# Patient Record
Sex: FEMALE | Race: BLACK OR AFRICAN AMERICAN | NOT HISPANIC OR LATINO | Employment: UNEMPLOYED | URBAN - METROPOLITAN AREA
[De-identification: names, ages, dates, MRNs, and addresses within clinical notes are randomized per-mention and may not be internally consistent; named-entity substitution may affect disease eponyms.]

---

## 2020-10-20 ENCOUNTER — HISTORICAL (OUTPATIENT)
Dept: ANESTHESIOLOGY | Facility: HOSPITAL | Age: 40
End: 2020-10-20

## 2020-10-20 LAB — B-HCG SERPL QL: NEGATIVE

## 2022-04-11 ENCOUNTER — HISTORICAL (OUTPATIENT)
Dept: ADMINISTRATIVE | Facility: HOSPITAL | Age: 42
End: 2022-04-11

## 2022-04-26 VITALS
HEIGHT: 65 IN | SYSTOLIC BLOOD PRESSURE: 133 MMHG | WEIGHT: 133.25 LBS | DIASTOLIC BLOOD PRESSURE: 82 MMHG | BODY MASS INDEX: 22.2 KG/M2

## 2022-05-04 NOTE — HISTORICAL [facility] CERNER
This is a historical note converted from [employer]. Formatting and pictures may have been removed.  Please reference [employer] for original formatting and attached multimedia. Type of Procedure: Fluoroscopically Guided Cervical Interlaminar Epidural Steroid Injection  ?  Physician: Bipin Murray III, MD  ?  Diagnosis: Cervical radiculopathy  ?  Description of Procedure:  After appropriate informed consent discussing the risks, benefits and possible complications, the patient was taken to the procedure suite. NIBP, pulse rate, and oxygen saturation were monitored throughout the procedure.?  ?  The patient was positioned prone and prepped and draped in a sterile fashion. The C6-7 interspace was identified fluoroscopically. The skin was anesthetized via 25-gauge 1.5?? needle with approximately 5 cc of 1% lidocaine. A 20-gauge Tuohy needle was atraumatically introduced and advanced under fluoroscopic guidance. Using loss of resistance technique with 0.9 % preservative free normal saline the epidural space was entered without difficulties. Following negative aspiration for blood and CSF and confirming the absence of paresthesias, injection of approximately 1.5 cc of Omnipaque 300 demonstrated excellent epidural spread without vascular or intrathecal uptake. At this point, 80 mg of Depo-Medrol followed by 2 mL of normal saline was injected without complication.  ?  The needle was re-styletted and removed. Adhesive dressing was applied over the site. Patient tolerated the procedure well without any apparent complications. The patient was transferred back to the recovery area and then discharged home.

## 2023-09-08 ENCOUNTER — OFFICE VISIT (OUTPATIENT)
Dept: OBSTETRICS AND GYNECOLOGY | Facility: CLINIC | Age: 43
End: 2023-09-08
Payer: MEDICARE

## 2023-09-08 VITALS
RESPIRATION RATE: 18 BRPM | HEIGHT: 64 IN | HEART RATE: 84 BPM | WEIGHT: 134.5 LBS | OXYGEN SATURATION: 99 % | DIASTOLIC BLOOD PRESSURE: 64 MMHG | SYSTOLIC BLOOD PRESSURE: 128 MMHG | BODY MASS INDEX: 22.96 KG/M2

## 2023-09-08 DIAGNOSIS — R53.83 FATIGUE, UNSPECIFIED TYPE: ICD-10-CM

## 2023-09-08 DIAGNOSIS — Z01.419 ROUTINE GYNECOLOGICAL EXAMINATION: Primary | ICD-10-CM

## 2023-09-08 DIAGNOSIS — R53.83 OTHER FATIGUE: ICD-10-CM

## 2023-09-08 DIAGNOSIS — Z12.31 ENCOUNTER FOR SCREENING MAMMOGRAM FOR MALIGNANT NEOPLASM OF BREAST: ICD-10-CM

## 2023-09-08 DIAGNOSIS — N89.8 VAGINAL ITCHING: ICD-10-CM

## 2023-09-08 DIAGNOSIS — Z11.3 ENCOUNTER FOR SCREENING FOR INFECTIONS WITH PREDOMINANTLY SEXUAL MODE OF TRANSMISSION: ICD-10-CM

## 2023-09-08 DIAGNOSIS — R23.2 HOT FLASHES: ICD-10-CM

## 2023-09-08 LAB
T4 FREE SERPL-MCNC: 1.15 NG/DL (ref 0.78–2.19)
TSH SERPL-ACNC: 1.28 UIU/ML (ref 0.36–3.74)

## 2023-09-08 PROCEDURE — 99204 OFFICE O/P NEW MOD 45 MIN: CPT | Mod: 25,,, | Performed by: OBSTETRICS & GYNECOLOGY

## 2023-09-08 PROCEDURE — 87491 CHLMYD TRACH DNA AMP PROBE: CPT

## 2023-09-08 PROCEDURE — 3074F SYST BP LT 130 MM HG: CPT | Mod: ,,, | Performed by: OBSTETRICS & GYNECOLOGY

## 2023-09-08 PROCEDURE — 3078F PR MOST RECENT DIASTOLIC BLOOD PRESSURE < 80 MM HG: ICD-10-PCS | Mod: ,,, | Performed by: OBSTETRICS & GYNECOLOGY

## 2023-09-08 PROCEDURE — 84439 ASSAY OF FREE THYROXINE: CPT | Performed by: OBSTETRICS & GYNECOLOGY

## 2023-09-08 PROCEDURE — 1159F MED LIST DOCD IN RCRD: CPT | Mod: ,,, | Performed by: OBSTETRICS & GYNECOLOGY

## 2023-09-08 PROCEDURE — 1159F PR MEDICATION LIST DOCUMENTED IN MEDICAL RECORD: ICD-10-PCS | Mod: ,,, | Performed by: OBSTETRICS & GYNECOLOGY

## 2023-09-08 PROCEDURE — 3074F PR MOST RECENT SYSTOLIC BLOOD PRESSURE < 130 MM HG: ICD-10-PCS | Mod: ,,, | Performed by: OBSTETRICS & GYNECOLOGY

## 2023-09-08 PROCEDURE — 88174 CYTOPATH C/V AUTO IN FLUID: CPT | Performed by: OBSTETRICS & GYNECOLOGY

## 2023-09-08 PROCEDURE — 84443 ASSAY THYROID STIM HORMONE: CPT | Performed by: OBSTETRICS & GYNECOLOGY

## 2023-09-08 PROCEDURE — 99386 PR PREVENTIVE VISIT,NEW,40-64: ICD-10-PCS | Mod: ,,, | Performed by: OBSTETRICS & GYNECOLOGY

## 2023-09-08 PROCEDURE — 82670 ASSAY OF TOTAL ESTRADIOL: CPT | Performed by: OBSTETRICS & GYNECOLOGY

## 2023-09-08 PROCEDURE — 87661 TRICHOMONAS VAGINALIS AMPLIF: CPT

## 2023-09-08 PROCEDURE — 3008F PR BODY MASS INDEX (BMI) DOCUMENTED: ICD-10-PCS | Mod: ,,, | Performed by: OBSTETRICS & GYNECOLOGY

## 2023-09-08 PROCEDURE — 83001 ASSAY OF GONADOTROPIN (FSH): CPT | Performed by: OBSTETRICS & GYNECOLOGY

## 2023-09-08 PROCEDURE — 87591 N.GONORRHOEAE DNA AMP PROB: CPT

## 2023-09-08 PROCEDURE — 3078F DIAST BP <80 MM HG: CPT | Mod: ,,, | Performed by: OBSTETRICS & GYNECOLOGY

## 2023-09-08 PROCEDURE — 99386 PREV VISIT NEW AGE 40-64: CPT | Mod: ,,, | Performed by: OBSTETRICS & GYNECOLOGY

## 2023-09-08 PROCEDURE — 3008F BODY MASS INDEX DOCD: CPT | Mod: ,,, | Performed by: OBSTETRICS & GYNECOLOGY

## 2023-09-08 PROCEDURE — 99204 PR OFFICE/OUTPT VISIT, NEW, LEVL IV, 45-59 MIN: ICD-10-PCS | Mod: 25,,, | Performed by: OBSTETRICS & GYNECOLOGY

## 2023-09-08 RX ORDER — FAMOTIDINE 20 MG/1
20 TABLET, FILM COATED ORAL EVERY 12 HOURS
COMMUNITY
Start: 2023-04-20

## 2023-09-08 RX ORDER — PANTOPRAZOLE SODIUM 40 MG/1
40 TABLET, DELAYED RELEASE ORAL EVERY MORNING
COMMUNITY
Start: 2023-07-11

## 2023-09-08 RX ORDER — RIZATRIPTAN BENZOATE 10 MG/1
TABLET ORAL
COMMUNITY
Start: 2023-07-08

## 2023-09-08 RX ORDER — ONDANSETRON 4 MG/1
4 TABLET, ORALLY DISINTEGRATING ORAL EVERY 6 HOURS PRN
COMMUNITY
Start: 2023-04-20 | End: 2023-09-26

## 2023-09-08 RX ORDER — AMLODIPINE BESYLATE 10 MG/1
10 TABLET ORAL
COMMUNITY
Start: 2023-07-08

## 2023-09-08 RX ORDER — BIMATOPROST 0.1 MG/ML
1 SOLUTION/ DROPS OPHTHALMIC NIGHTLY
COMMUNITY
Start: 2023-08-17

## 2023-09-08 RX ORDER — TRAVOPROST OPHTHALMIC SOLUTION 0.04 MG/ML
1 SOLUTION OPHTHALMIC NIGHTLY
COMMUNITY
Start: 2023-08-17

## 2023-09-08 RX ORDER — CLONAZEPAM 0.5 MG/1
0.5 TABLET ORAL
COMMUNITY
Start: 2023-08-28

## 2023-09-08 NOTE — PROGRESS NOTES
Patient ID: [id number]   Chief Complaint: Annual exam  Chief Complaint   Patient presents with    Well Woman     ALSO C/o vaginal itching. C/o hot flashes at night and fatigue.      HPI:   Jacey Blood is a 43 y.o. year old  here for her Annual Exam. No LMP recorded. Patient has had an ablation.   C/o vaginal itching. Denies any odor or discharge.  Denies fever, C/o hot flashes at night for several months and fatigue during the day.    Subjective:     Past Medical History:   Diagnosis Date    Anxiety disorder, unspecified     Hypertension      Past Surgical History:   Procedure Laterality Date    ABLATION      BILATERAL TUBAL LIGATION      cyst removed from breast Left     OVARIAN CYST REMOVAL Bilateral      Social History     Tobacco Use    Smoking status: Every Day     Current packs/day: 0.50     Average packs/day: 0.5 packs/day for 23.7 years (11.9 ttl pk-yrs)     Types: Cigarettes     Start date:      Passive exposure: Current    Smokeless tobacco: Never   Substance Use Topics    Alcohol use: Yes     Comment: social    Drug use: Yes     Frequency: 1.0 times per week     Types: Marijuana     Family History   Problem Relation Age of Onset    Hypertension Father     Hypertension Mother      OB History    Para Term  AB Living   4 3 3   1 3   SAB IAB Ectopic Multiple Live Births       1   3      # Outcome Date GA Lbr Shukri/2nd Weight Sex Delivery Anes PTL Lv   4 Term 09 40w0d   F Vag-Spont EPI  MARYSE   3 Ectopic  3w0d    ECTOPIC   FD   2 Term 10/30/01 40w0d   M Vag-Spont EPI  MARYSE   1 Term 99 40w0d   M Vag-Spont None  MARYSE       Current Outpatient Medications:     amLODIPine (NORVASC) 10 MG tablet, Take 10 mg by mouth., Disp: , Rfl:     clonazePAM (KLONOPIN) 0.5 MG tablet, Take 0.5 mg by mouth., Disp: , Rfl:     famotidine (PEPCID) 20 MG tablet, Take 20 mg by mouth every 12 (twelve) hours., Disp: , Rfl:     LUMIGAN 0.01 % Drop, Place 1 drop into both eyes every evening., Disp: ,  "Rfl:     ondansetron (ZOFRAN-ODT) 4 MG TbDL, Take 4 mg by mouth every 6 (six) hours as needed., Disp: , Rfl:     pantoprazole (PROTONIX) 40 MG tablet, Take 40 mg by mouth every morning., Disp: , Rfl:     rizatriptan (MAXALT) 10 MG tablet, Take by mouth., Disp: , Rfl:     travoprost (TRAVATAN Z) 0.004 % ophthalmic solution, Place 1 drop into both eyes every evening., Disp: , Rfl:   MENARCHEAL:  No cycle -ablation  PAP:  Last PAP: 1/27/2020  History of Abnormal PAP Smear: NO  Treated: n/a  HPV Vaccine: NO  INTERCOURSE:  Dyspareunia: No  Postcoital Bleeding: No  History of STI: Yes   If yes, then: TV  Current Birth Control Method: none  Sexually Active: not currently  BREAST HISTORY:   Last Mammogram: 2021  History of Abnormal Mammogram: NO  MENOPAUSE:  Post Menopausal Bleeding: No  Hormone Replacement Therapy: No  COLONOSCOPY:  Last Colonoscopy:   n/a        Review of Systems 12 point review of systems conducted, negative except as stated in the history of present illness. See HPI for details.  Objective:   Visit Vitals  /64 (BP Location: Left arm)   Pulse 84   Resp 18   Ht 5' 4" (1.626 m)   Wt 61 kg (134 lb 8 oz)   SpO2 99%   BMI 23.09 kg/m²     No results found for this or any previous visit (from the past 24 hour(s)).  Physical Exam:  Physical Exam  Constitutional:  General Appearance : alert, in no acute distress, normal, well nourished.  Respiratory:  Respiratory Effort: normal.  Breast:  Right: Inspection/palpation: no discharge, no masses present, no nipple retraction, no skin changes, no skin dimpling, no tenderness, no lymphadenopathy, no axillary mass, no axillary tenderness.  Left: Inspection/palpation: no discharge, no masses present, no nipple retraction, no skin changes, no skin dimpling, no tenderness, no lymphadenopathy, no axillary mass, no axillary tenderness.  Gastrointestinal:  Abdomen: no masses. no tender, nondistended.  Liver and spleen: normal  Hernias: no hernias present, no inguinal " adenopathy.  Genitourinary:  External Genitalia: normal, no lesions.  Vagina: normal appearance, no abnormal discharge, no lesions.  Bladder: no mass, nontender.  Urethra: no erythema or lesions present.  Cervix: no lesions, non tender. Pap Done/CXS  Uterus: nontender, normal contour, normal mobility, normal size.   Adnexa: no masses, no tenderness.  Anus and Perineum: visually normal.   Chaperone Present  No results found for this or any previous visit (from the past 24 hour(s)).  Assessment/Plan:   Assessment:   Routine gynecological examination  -     Liquid-Based Pap Smear, Screening Screening; Future; Expected date: 09/21/2023  -     Mammo Digital Screening Bilat; Future; Expected date: 09/08/2023    Encounter for screening for infections with predominantly sexual mode of transmission  -     Liquid-Based Pap Smear, Screening Screening; Future; Expected date: 09/21/2023    Encounter for screening mammogram for malignant neoplasm of breast  -     Mammo Digital Screening Bilat; Future; Expected date: 09/08/2023    Vaginal itching  -     MDL Sendout Test    Hot flashes  -     TSH; Future; Expected date: 09/08/2023  -     T4, Free; Future; Expected date: 09/08/2023  -     Follicle Stimulating Hormone; Future; Expected date: 09/08/2023  -     Estradiol; Future; Expected date: 09/08/2023    Fatigue, unspecified type  -     TSH; Future; Expected date: 09/08/2023    Fatigue  -     T4, Free; Future; Expected date: 09/08/2023      Follow up in about 2 weeks (around 9/22/2023) for RESULTS. In addition to their scheduled FU, the patient has also been instructed to follow up on as needed basis. All questions were answered and the patient voiced understanding of the above issues.

## 2023-09-09 LAB
ESTRADIOL SERPL HS-MCNC: <24 PG/ML
FSH SERPL-ACNC: 107.49 MIU/ML

## 2023-09-12 LAB — PSYCHE PATHOLOGY RESULT: NORMAL

## 2023-09-26 ENCOUNTER — OFFICE VISIT (OUTPATIENT)
Dept: OBSTETRICS AND GYNECOLOGY | Facility: CLINIC | Age: 43
End: 2023-09-26
Payer: MEDICARE

## 2023-09-26 VITALS
DIASTOLIC BLOOD PRESSURE: 68 MMHG | WEIGHT: 130.06 LBS | BODY MASS INDEX: 22.2 KG/M2 | HEIGHT: 64 IN | HEART RATE: 86 BPM | SYSTOLIC BLOOD PRESSURE: 120 MMHG

## 2023-09-26 DIAGNOSIS — R53.83 FATIGUE, UNSPECIFIED TYPE: ICD-10-CM

## 2023-09-26 DIAGNOSIS — R23.2 HOT FLASHES: Primary | ICD-10-CM

## 2023-09-26 DIAGNOSIS — Z78.0 POST-MENOPAUSAL: ICD-10-CM

## 2023-09-26 PROCEDURE — 1160F RVW MEDS BY RX/DR IN RCRD: CPT | Mod: ,,, | Performed by: NURSE PRACTITIONER

## 2023-09-26 PROCEDURE — 99213 PR OFFICE/OUTPT VISIT, EST, LEVL III, 20-29 MIN: ICD-10-PCS | Mod: ,,, | Performed by: NURSE PRACTITIONER

## 2023-09-26 PROCEDURE — 3074F PR MOST RECENT SYSTOLIC BLOOD PRESSURE < 130 MM HG: ICD-10-PCS | Mod: ,,, | Performed by: NURSE PRACTITIONER

## 2023-09-26 PROCEDURE — 3074F SYST BP LT 130 MM HG: CPT | Mod: ,,, | Performed by: NURSE PRACTITIONER

## 2023-09-26 PROCEDURE — 99213 OFFICE O/P EST LOW 20 MIN: CPT | Mod: ,,, | Performed by: NURSE PRACTITIONER

## 2023-09-26 PROCEDURE — 3008F BODY MASS INDEX DOCD: CPT | Mod: ,,, | Performed by: NURSE PRACTITIONER

## 2023-09-26 PROCEDURE — 3078F PR MOST RECENT DIASTOLIC BLOOD PRESSURE < 80 MM HG: ICD-10-PCS | Mod: ,,, | Performed by: NURSE PRACTITIONER

## 2023-09-26 PROCEDURE — 1159F PR MEDICATION LIST DOCUMENTED IN MEDICAL RECORD: ICD-10-PCS | Mod: ,,, | Performed by: NURSE PRACTITIONER

## 2023-09-26 PROCEDURE — 1160F PR REVIEW ALL MEDS BY PRESCRIBER/CLIN PHARMACIST DOCUMENTED: ICD-10-PCS | Mod: ,,, | Performed by: NURSE PRACTITIONER

## 2023-09-26 PROCEDURE — 3078F DIAST BP <80 MM HG: CPT | Mod: ,,, | Performed by: NURSE PRACTITIONER

## 2023-09-26 PROCEDURE — 3008F PR BODY MASS INDEX (BMI) DOCUMENTED: ICD-10-PCS | Mod: ,,, | Performed by: NURSE PRACTITIONER

## 2023-09-26 PROCEDURE — 1159F MED LIST DOCD IN RCRD: CPT | Mod: ,,, | Performed by: NURSE PRACTITIONER

## 2023-09-26 RX ORDER — ATORVASTATIN CALCIUM 40 MG/1
TABLET, FILM COATED ORAL
COMMUNITY

## 2023-09-26 NOTE — PROGRESS NOTES
Patient ID: [id number]   Chief Complaint: Results (NO C/O'S.)    HPI:     Jacey Blood is a 43 y.o.  here today for Results (NO C/O'S.)   No LMP recorded. Patient has had an ablation.    Past Medical History:  has a past medical history of Anxiety disorder, unspecified and Hypertension.    Surgical History:  has a past surgical history that includes Ablation; Bilateral tubal ligation; Ovarian cyst removal (Bilateral); and cyst removed from breast (Left).    Family History: family history includes Hypertension in her father and mother.    Social History:  reports that she has been smoking cigarettes. She started smoking about 23 years ago. She has a 11.9 pack-year smoking history. She has been exposed to tobacco smoke. She has never used smokeless tobacco. She reports current alcohol use. She reports current drug use. Frequency: 1.00 time per week. Drug: Marijuana.    Current Outpatient Medications   Medication Sig Dispense Refill    amLODIPine (NORVASC) 10 MG tablet Take 10 mg by mouth.      atorvastatin (LIPITOR) 40 MG tablet Take 1 tablet every day by oral route at bedtime.      clonazePAM (KLONOPIN) 0.5 MG tablet Take 0.5 mg by mouth.      famotidine (PEPCID) 20 MG tablet Take 20 mg by mouth every 12 (twelve) hours.      LUMIGAN 0.01 % Drop Place 1 drop into both eyes every evening.      pantoprazole (PROTONIX) 40 MG tablet Take 40 mg by mouth every morning.      rizatriptan (MAXALT) 10 MG tablet Take by mouth.      travoprost (TRAVATAN Z) 0.004 % ophthalmic solution Place 1 drop into both eyes every evening.       No current facility-administered medications for this visit.       Patient is allergic to betadine surgi-prep, opioids - morphine analogues, and penicillins.     MENARCHEAL:  Cycle Length: ABLATION  Flow: N/A  Dysmenorrhea: No  If yes: N/A  Intermenstrual Bleeding: No  PAP:  Last PAP: 2023    History of Abnormal PAP Smear: NO  Treated: N/A  HPV Vaccine: NO  INTERCOURSE:  Dyspareunia:  "No  Postcoital Bleeding: No  History of STI: Yes   If yes, then: TV  Current Birth Control Method: ABLATION  Sexually Active: no  BREAST HISTORY:   Last Mammogram: 09/2023  History of Abnormal Mammogram: NO  MENOPAUSE:  Post Menopausal Bleeding: No  Hormone Replacement Therapy: No  No results found for this or any previous visit (from the past 24 hour(s)).    Subjective:     Review of Systems    12 point review of systems conducted, negative except as stated in the history of present illness. See HPI for details.      Objective:     Visit Vitals  /68   Pulse 86   Ht 5' 4" (1.626 m)   Wt 59 kg (130 lb 1.1 oz)   BMI 22.33 kg/m²     No results found for this or any previous visit (from the past 24 hour(s)).  Physical Exam  Constitutional:  General Appearance : alert, in no acute distress, normal, well nourished.  Neck/Thyroid:  Inspection/Palpation: normal. Thyroid: normal size and shape.  Respiratory:  Respiratory Effort: normal.  Gastrointestinal:  Abdomen: no masses. no tender, nondistended.  Liver and spleen: normal  Hernias: no hernias present, no inguinal adenopathy.  Genitourinary:  External Genitalia: normal, no lesions.  Vagina: normal appearance, no abnormal discharge, no lesions.  Bladder: no mass, nontender.  Urethra: no erythema or lesions present.  Cervix: no lesions, non tender.   Uterus: nontender, normal contour, normal mobility, normal size.   Adnexa: no masses, no tenderness.  Anus and Perineum: visually normal.   Chaperone Present  Assessment:       ICD-10-CM ICD-9-CM   1. Hot flashes  R23.2 782.62   2. Fatigue, unspecified type  R53.83 780.79     Plan   Hot flashes    Fatigue, unspecified type    Post-menopausal       Instr pt post menopausal. Pt will try black cohosh. No HRT rx d/t pt history    No follow-ups on file. In addition to their scheduled follow up, the patient has also been instructed to follow up on as needed basis.     Eddie Bradford MA  "